# Patient Record
Sex: FEMALE | Race: WHITE | Employment: UNEMPLOYED | URBAN - METROPOLITAN AREA
[De-identification: names, ages, dates, MRNs, and addresses within clinical notes are randomized per-mention and may not be internally consistent; named-entity substitution may affect disease eponyms.]

---

## 2019-10-09 ENCOUNTER — TRANSFERRED RECORDS (OUTPATIENT)
Dept: HEALTH INFORMATION MANAGEMENT | Facility: CLINIC | Age: 54
End: 2019-10-09

## 2019-10-10 ENCOUNTER — TRANSFERRED RECORDS (OUTPATIENT)
Dept: HEALTH INFORMATION MANAGEMENT | Facility: CLINIC | Age: 54
End: 2019-10-10

## 2019-12-13 ENCOUNTER — TRANSFERRED RECORDS (OUTPATIENT)
Dept: HEALTH INFORMATION MANAGEMENT | Facility: CLINIC | Age: 54
End: 2019-12-13

## 2019-12-16 ENCOUNTER — TRANSCRIBE ORDERS (OUTPATIENT)
Dept: OTHER | Age: 54
End: 2019-12-16

## 2019-12-16 DIAGNOSIS — R53.1 WEAKNESS: Primary | ICD-10-CM

## 2019-12-17 ENCOUNTER — DOCUMENTATION ONLY (OUTPATIENT)
Dept: CARE COORDINATION | Facility: CLINIC | Age: 54
End: 2019-12-17

## 2019-12-17 NOTE — TELEPHONE ENCOUNTER
RECORDS RECEIVED FROM: External - Dr Ibarra - [facility]   DATE RECEIVED: 2/12/20   NOTES (FOR ALL VISITS) STATUS DETAILS   OFFICE NOTE from referring provider Care Everywhere 11/21/19  10/16/19  10/1/19   OFFICE NOTE from other specialist Care Everywhere [facility]:  9/24/19   DISCHARGE SUMMARY from hospital N/A    DISCHARGE REPORT from the ER N/A    OPERATIVE REPORT N/A    MEDICATION LIST Care Everywhere    IMAGING  (FOR ALL VISITS)     EMG Received [facility]:  10/9/19   EEG N/A    ECT N/A    MRI (HEAD, NECK, SPINE) Received [facility]:  MRI Head 10/9/19  MRI Cervical Spine 10/9/19  MRI Brain 2/22/18   LUMBAR PUNCTURE N/A    DANNY Scan N/A    CT (HEAD, NECK, SPINE) N/A       Action 12/17/19   Action Taken Imaging request faxed to [facility] for:  MRI Head 10/9/19  MRI Cervical Spine 10/9/19  MRI Brain 2/22/18     Action 12/17/19   Action Taken EMG report request faxed to [facility] ([facility])     Action 1/3/20 MV 2.26pm   Action Taken 2nd request faxed to [facility] for EMG report     Action 1/7/20 MV 11.58am   Action Taken EMG report received via fax from [facility]. Sent to scanning

## 2019-12-19 NOTE — TELEPHONE ENCOUNTER
Imaging Received  12/19/19  [facility]   Image Type (x): Disc:   PACS: x   Exam Date/Name MRI Head 10/9/19  MRI Cervical Spine 10/9/19  MRI Brain 2/22/18 Comments: images resolved in PACS

## 2020-01-02 ENCOUNTER — TELEPHONE (OUTPATIENT)
Dept: NEUROLOGY | Facility: CLINIC | Age: 55
End: 2020-01-02

## 2020-01-02 NOTE — TELEPHONE ENCOUNTER
Patient can come at 1/6/2020 at 8.50 am if she wishes. Please let her know. I will contact Dr Ibarra later. Thank you

## 2020-01-02 NOTE — TELEPHONE ENCOUNTER
Tuesday 1/14, at 1.00 pm. If she accepts, please call Dr Ibarra's office too and tell them we scheduled her sooner per their request. Thank you

## 2020-01-02 NOTE — TELEPHONE ENCOUNTER
[facility] Call Center    Phone Message    May a detailed message be left on voicemail: yes    Reason for Call: Other: Dr. Ibarra from [facility] requesting call back from Dr. Friedman to discuss this pt, and possibly getting this pt in sooner     Action Taken: Message routed to:  [facility] ([facility] neuro

## 2020-01-14 ENCOUNTER — OFFICE VISIT (OUTPATIENT)
Dept: NEUROLOGY | Facility: CLINIC | Age: 55
End: 2020-01-14
Attending: PSYCHIATRY & NEUROLOGY
Payer: COMMERCIAL

## 2020-01-14 ENCOUNTER — OFFICE VISIT (OUTPATIENT)
Dept: NEUROLOGY | Facility: CLINIC | Age: 55
End: 2020-01-14
Payer: COMMERCIAL

## 2020-01-14 VITALS
DIASTOLIC BLOOD PRESSURE: 90 MMHG | WEIGHT: 164.7 LBS | OXYGEN SATURATION: 97 % | SYSTOLIC BLOOD PRESSURE: 153 MMHG | HEIGHT: 61 IN | RESPIRATION RATE: 16 BRPM | HEART RATE: 79 BPM | BODY MASS INDEX: 31.1 KG/M2

## 2020-01-14 DIAGNOSIS — G12.21 ALS (AMYOTROPHIC LATERAL SCLEROSIS) (H): Primary | ICD-10-CM

## 2020-01-14 DIAGNOSIS — G56.02 CARPAL TUNNEL SYNDROME OF LEFT WRIST: ICD-10-CM

## 2020-01-14 RX ORDER — ESCITALOPRAM OXALATE 20 MG/1
20 TABLET ORAL DAILY
COMMUNITY
Start: 2016-12-28

## 2020-01-14 RX ORDER — CIPROFLOXACIN 500 MG/1
500 TABLET, FILM COATED ORAL PRN
COMMUNITY
Start: 2019-11-25

## 2020-01-14 RX ORDER — TRAZODONE HYDROCHLORIDE 50 MG/1
50 TABLET, FILM COATED ORAL DAILY
COMMUNITY
Start: 2017-10-29

## 2020-01-14 ASSESSMENT — MIFFLIN-ST. JEOR: SCORE: 1279.45

## 2020-01-14 ASSESSMENT — PAIN SCALES - GENERAL: PAINLEVEL: NO PAIN (0)

## 2020-01-14 NOTE — LETTER
"     RE: Klarissa Ni  [address] [address]     Dear Colleague,    Thank you for referring your patient, Klarissa Ni, to the [facility]. Please see a copy of my visit note below.    Service Date: 2020      Michael Ibarra MD   [facility]    [address]   [place] [address]      RE: Klarissa Ni   MRN: [id number]   : 1965      Dear Dr. Ibarra:      I had the pleasure to see Ms. Ni at the [facility] for an evaluation of the cause of her generalized weakness and dysarthria.  As you know, she is a very pleasant 54-year-old woman with a  history of 2 lumbar spine surgeries and depression.  Otherwise, no notable medical history.  Around 2019, after a regular walk outdoors she noticed a sudden onset of shortness of breath.  She was hospitalized locally and underwent extensive cardiac evaluation including echo and a coronary angiogram that were unremarkable.  [facility] where [facility] had imaging and she was told there is no significant intrathoracic pathology to explain her complaints.  Her dyspnea on exertion has persisted to date.  It is not accompanied by a cough or phlegm production.  She denies orthopnea.  She sleeps with 1 pillow at night. She does not wake up with morning headaches.  She does not have any lower extremity edema.  A cause of the dyspnea was never found.      Around 2019 she began developing dysarthria.  Her voice became more \" scratchy\" and hoarse. Those symptoms deteriorated over the subsequent 6 months to the point she has to repeat herself to others at times.  She denies sialorrhea or pseudobulbar affect.  She does have dysphagia in the last 2 or 3 months with solids or liquids and occasional choking episodes.  She has lost about 23-24 pounds of weight in the past year.  She denies ptosis, diplopia or facial numbness. "  She notes occasional head drop at any time during the day, needing to hold her head up with her fingers.  She also has noted generalized muscle weakness.  It began from her legs in the mid year with difficulty getting up from low seated chairs and ascending or descending stairs.  Now she occasionally trips on her feet and has a tendency to fall.  She does not use any assistive device.   In the past 6 months or so she has also noted significant upper extremity weakness, left much more than right.  She has trouble mostly with fine motor tasks, opening jars, etc.  Her shoulder muscles also fatigue when she blow dries her hair. She denies any numbness or tingling and there is no incontinence of bowel or bladder.  She does have calf pain and cramps at times and she reports frequent muscle twitching in upper and lower extremities.      She had an evaluation locally including an MRI of the brain done on 10/10/2019 in [place] which showed a single white matter lesion in T2 FLAIR within the left frontal operculum, which could be related to migraines but no other acute intracranial abnormality or real explanation for her symptoms.  MRI of the cervical spine done on the same day showed mild to moderate degree of spinal stenosis, again not explaining her hand weakness.  She had an EMG done by Dr. Nice on 10/09/2019.  I reviewed the results.  This EMG was interpreted as severe bilateral median neuropathies at the wrists consistent with carpal tunnel syndrome.  There was no evidence of ulnar neuropathy at the elbow.  Needle examination showed some denervation changes in the APB, FDI and right flexor carpi ulnaris, but deltoid, biceps and triceps and pronator teres were labeled normal.  I reviewed the results and I do not agree with the diagnosis of severe carpal tunnel.  There is mild median neuropathy at the wrist as there is slowing of sensory nerve conduction study, but there is marked attenuation of the CMAP amplitude of  the APB with normal or minimally prolonged median motor latencies, which is not consistent with carpal tunnel.      PAST MEDICAL HISTORY, FAMILY HISTORY, REVIEW OF SYSTEMS, CURRENT MEDICATIONS:  As outlined in Epic record.      ALLERGIES:  Penicillin (rash).      FAMILY HISTORY:  She has no family history of dementia, ALS or other neurodegenerative disease as Parkinson's.        She does not smoke, use alcohol or illicit drugs.      PHYSICAL EXAMINATION:   VITAL SIGNS:  Blood pressure of 153/90.  Pulse 79 and regular.  Respiratory rate 16.  O2 sat 97% on room air.  Weight 74.7 kilos.  Height is 154.  She endorsed no pain.   NEUROLOGIC:  She is awake, alert and oriented x3.  She is able to provide a coherent history.  She has no ptosis or ophthalmoparesis.  Ductions and versions of the eyes are normal.  Visual fields are full to confrontation bilaterally.  Pupil reaction to light and accommodation is normal.  There is mild weakness of orbicularis oculi bilaterally.  There is moderate to severe weakness of orbicularis oris.  Cheek puff in particular is very weak.  Tongue shows minimal atrophy and some subtle fasciculations, which are best visualized with the tongue immobile in the mouth and with a penlight.  Lateral tongue movements are mildly slow.  There is a brisk jaw jerk.  She has a clearcut spastic dysarthria.  She is fully intelligible still.  Uvula and palate seem to be elevating normally.  She has a little weak cough reflex.  She has at least moderate weakness of the pterygoids.  She does have some fasciculations in the mentalis as well.  Neck flexion was 4/5 and so was extension.  Strength in the limbs was as follows on MRC Scale (right/left):  Deltoids 4/4, pectoralis 4-/4-, biceps 4/4+, triceps 5/5-, wrist extension showed some motor impersistence and were difficult to rate, finger extension 4/3 to 4-, FDI 4-/3, APB 2/0.  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  There were fasciculations in the FDI and forearm muscles bilaterally.  I did not see any fasciculations in the legs.  Tone was normal in upper and lower extremities.  Strength was 4- to 4/5 for bilateral hip flexion.  Knee extension was somewhat impersistent and difficult to rate, right knee flexion was probably 4 on the left and 4+ on the right and foot dorsiflexion was right 4, left nearly 5.  Reflexes were 3 in biceps, triceps, brachioradialis with vertical spread in all.  She has positive pectoralis and Alhaji.  Knee jerks were right normal 2+, left 3+ with mild spread and ankle jerks were 2+ and symmetric.  There is no ankle clonus and toes were downgoing.  Sensory exam was intact to light touch, pinprick, and position sensation in toes and hands.  She had mild tremor of outstretched hands.  She had no dysmetria on finger-to-nose.  She was able to get up from a chair with arms crossed on the chest, but with some difficulty.  Romberg sign was negative.  She could walk on her toes, but she was challenged to walk on her heels.      EMG was repeated today. There was widespread active and to a lesser extent chronic denervation involving bulbar muscles (trapezius), multiple cervical, thoracic, and lumbosacral muscles.    IMPRESSION:  Amyotrophic lateral sclerosis, clinically probable to definite.        I spent about 30 minutes with the patient, of which more than half was counseling. I believe that patient's history and physical examination were telling.  She has upper and lower motor neuron signs in the bulbar region with a classic spastic dysarthria and a brisk jaw jerk, subtle tongue fasciculations and obvious facial, jaw and tongue weakness.  She also has hyperreflexia in the upper extremities with striking segmental weakness and a classic pattern of hand muscle atrophy called split hand. There is practically no other diagnosis that fits with this pattern, especially knowing that her MRI of brain  and cervical spine did not show an alternative explanation and that her EMG confirmed the presence of widespread denervation including cranial muscles.     The patient unfortunately had no warning that this could be motor neuron disease, although she did not appear 100% surprised. I offered emotional support. From here on I recommend that she follows in our multidisciplinary ALS Clinic because there are a number of things we need to discuss including evaluation by physical, occupational, speech and nutrition therapists, medications that can slightly slow the disease progression, research options available, registration with ALS Association and other patient support groups, etc.  The patient is willing to follow and we will arrange for an appointment as soon as possible.  Of course, she also needs spirometry given her complaints of dyspnea and we will order this.  Spirometry results are critical for her prognosis.     We did not discuss details about the natural history, prognosis and management of ALS, except that the patient asked me about the average survival and I told her this is usually about 3 years from the moment symptoms start, with 20% of patients living more than 5, and 10% living more than 10.       Sincerely,       Latrell Friedman MD       cc:   Amador Alex MD   [facility]    [address] [place] [id number]       D: 2020   T: 2020   MT: AUDELIA      Name:     DINA SMITH   MRN:      [id number]        Account:      [id number]   :      1965           Service Date: 2020      Document: [id number]

## 2020-01-14 NOTE — NURSING NOTE
Chief Complaint   Patient presents with     New Patient     ump new muscular dystrophy     Luzma Simeon cma

## 2020-01-14 NOTE — PROGRESS NOTES
"Service Date: 2020      Michael Ibarra MD   [facility]    [address]   [place] [address]      RE: Klarissa Ni   MRN: [id number]   : 1965      Dear Dr. Ibarra:      I had the pleasure to see Ms. Ni at the [facility] for an evaluation of the cause of her generalized weakness and dysarthria.  As you know, she is a very pleasant 54-year-old woman with a  history of 2 lumbar spine surgeries and depression.  Otherwise, no notable medical history.  Around 2019, after a regular walk outdoors she noticed a sudden onset of shortness of breath.  She was hospitalized locally and underwent extensive cardiac evaluation including echo and a coronary angiogram that were unremarkable.  She then went to a [facility] where she had imaging and she was told there is no significant intrathoracic pathology to explain her complaints.  Her dyspnea on exertion has persisted to date.  It is not accompanied by a cough or phlegm production.  She denies orthopnea.  She sleeps with 1 pillow at night. She does not wake up with morning headaches.  She does not have any lower extremity edema.  A cause of the dyspnea was never found.      Around 2019 she began developing dysarthria.  Her voice became more \" scratchy\" and hoarse. Those symptoms deteriorated over the subsequent 6 months to the point she has to repeat herself to others at times.  She denies sialorrhea or pseudobulbar affect.  She does have dysphagia in the last 2 or 3 months with solids or liquids and occasional choking episodes.  She has lost about 23-24 pounds of weight in the past year.  She denies ptosis, diplopia or facial numbness.  She notes occasional head drop at any time during the day, needing to hold her head up with her fingers.  She also has noted generalized muscle weakness.  It began from her legs in the mid year with difficulty getting up from low seated chairs and ascending or " descending stairs.  Now she occasionally trips on her feet and has a tendency to fall.  She does not use any assistive device.   In the past 6 months or so she has also noted significant upper extremity weakness, left much more than right.  She has trouble mostly with fine motor tasks, opening jars, etc.  Her shoulder muscles also fatigue when she blow dries her hair. She denies any numbness or tingling and there is no incontinence of bowel or bladder.  She does have calf pain and cramps at times and she reports frequent muscle twitching in upper and lower extremities.      She had an evaluation locally including an MRI of the brain done on 10/10/2019 in [place] which showed a single white matter lesion in T2 FLAIR within the left frontal operculum, which could be related to migraines but no other acute intracranial abnormality or real explanation for her symptoms.  MRI of the cervical spine done on the same day showed mild to moderate degree of spinal stenosis, again not explaining her hand weakness.  She had an EMG done by Dr. Nice on 10/09/2019.  I reviewed the results.  This EMG was interpreted as severe bilateral median neuropathies at the wrists consistent with carpal tunnel syndrome.  There was no evidence of ulnar neuropathy at the elbow.  Needle examination showed some denervation changes in the APB, FDI and right flexor carpi ulnaris, but deltoid, biceps and triceps and pronator teres were labeled normal.  I reviewed the results and I do not agree with the diagnosis of severe carpal tunnel.  There is mild median neuropathy at the wrist as there is slowing of sensory nerve conduction study, but there is marked attenuation of the CMAP amplitude of the APB with normal or minimally prolonged median motor latencies, which is not consistent with carpal tunnel.      PAST MEDICAL HISTORY, FAMILY HISTORY, REVIEW OF SYSTEMS, CURRENT MEDICATIONS:  As outlined in Epic record.      ALLERGIES:  Penicillin (rash).       FAMILY HISTORY:  She has no family history of dementia, ALS or other neurodegenerative disease as Parkinson's.        She does not smoke, use alcohol or illicit drugs.      PHYSICAL EXAMINATION:   VITAL SIGNS:  Blood pressure of 153/90.  Pulse 79 and regular.  Respiratory rate 16.  O2 sat 97% on room air.  Weight 74.7 kilos.  Height is 154.  She endorsed no pain.   NEUROLOGIC:  She is awake, alert and oriented x3.  She is able to provide a coherent history.  She has no ptosis or ophthalmoparesis.  Ductions and versions of the eyes are normal.  Visual fields are full to confrontation bilaterally.  Pupil reaction to light and accommodation is normal.  There is mild weakness of orbicularis oculi bilaterally.  There is moderate to severe weakness of orbicularis oris.  Cheek puff in particular is very weak.  Tongue shows minimal atrophy and some subtle fasciculations, which are best visualized with the tongue immobile in the mouth and with a penlight.  Lateral tongue movements are mildly slow.  There is a brisk jaw jerk.  She has a clearcut spastic dysarthria.  She is fully intelligible still.  Uvula and palate seem to be elevating normally.  She has a little weak cough reflex.  She has at least moderate weakness of the pterygoids.  She does have some fasciculations in the mentalis as well.  Neck flexion was 4/5 and so was extension.  Strength in the limbs was as follows on MRC Scale (right/left):  Deltoids 4/4, pectoralis 4-/4-, biceps 4/4+, triceps 5/5-, wrist extension showed some motor impersistence and were difficult to rate, finger extension 4/3 to 4-, FDI 4-/3, APB 2/0.  There is marked thenar>FDI atrophy bilaterally, with a classic split hand syndrome on both sides.  There were fasciculations in the FDI and forearm muscles bilaterally.  I did not see any fasciculations in the legs.  Tone was normal in upper and lower extremities.  Strength was 4- to 4/5 for bilateral hip flexion.  Knee extension was somewhat  impersistent and difficult to rate, right knee flexion was probably 4 on the left and 4+ on the right and foot dorsiflexion was right 4, left nearly 5.  Reflexes were 3 in biceps, triceps, brachioradialis with vertical spread in all.  She has positive pectoralis and Alhaji.  Knee jerks were right normal 2+, left 3+ with mild spread and ankle jerks were 2+ and symmetric.  There is no ankle clonus and toes were downgoing.  Sensory exam was intact to light touch, pinprick, and position sensation in toes and hands.  She had mild tremor of outstretched hands.  She had no dysmetria on finger-to-nose.  She was able to get up from a chair with arms crossed on the chest, but with some difficulty.  Romberg sign was negative.  She could walk on her toes, but she was challenged to walk on her heels.      EMG was repeated today. There was widespread active and to a lesser extent chronic denervation involving bulbar muscles (trapezius), multiple cervical, thoracic, and lumbosacral muscles.    IMPRESSION:  Amyotrophic lateral sclerosis, clinically probable to definite.        I spent about 30 minutes with the patient, of which more than half was counseling. I believe that patient's history and physical examination were telling.  She has upper and lower motor neuron signs in the bulbar region with a classic spastic dysarthria and a brisk jaw jerk, subtle tongue fasciculations and obvious facial, jaw and tongue weakness.  She also has hyperreflexia in the upper extremities with striking segmental weakness and a classic pattern of hand muscle atrophy called split hand. There is practically no other diagnosis that fits with this pattern, especially knowing that her MRI of brain and cervical spine did not show an alternative explanation and that her EMG confirmed the presence of widespread denervation including cranial muscles.     The patient unfortunately had no warning that this could be motor neuron disease, although she did not  appear 100% surprised. I offered emotional support. From here on I recommend that she follows in our multidisciplinary ALS Clinic because there are a number of things we need to discuss including evaluation by physical, occupational, speech and nutrition therapists, medications that can slightly slow the disease progression, research options available, registration with ALS Association and other patient support groups, etc.  The patient is willing to follow and we will arrange for an appointment as soon as possible.  Of course, she also needs spirometry given her complaints of dyspnea and we will order this.  Spirometry results are critical for her prognosis.     We did not discuss details about the natural history, prognosis and management of ALS, except that the patient asked me about the average survival and I told her this is usually about 3 years from the moment symptoms start, with 20% of patients living more than 5, and 10% living more than 10.       Sincerely,       Latrell Dorantes MD       cc:   Amador Alex MD   [place] [id number] [place] [id number]         LATRELL DORANTES MD             D: 2020   T: 2020   MT: AUDELIA      Name:     DINA SMITH   MRN:      [id number]        Account:      [id number]   :      1965           Service Date: 2020      Document: [id number]

## 2020-01-14 NOTE — PROGRESS NOTES
[facility]  Electrodiagnostic Laboratory    Nerve Conduction & EMG Report          Patient:       Klarissa Ni  Patient ID:    [id number]  Gender:        Female  YOB: 1965  Age:           54 Years 6 Months      History & Examination:    54 year old woman with suspected bulbar onset ALS. EMG requested to evaluate for motor neuron disease.    Techniques: Motor and sensory conduction studies were done with surface recording electrodes. Temperature was monitored and recorded throughout the study. Upper extremities were maintained at a temperature of 32 degrees Centigrade or higher.  Lower extremities were maintained at a temperature of 31degrees Centigrade or higher. EMG was done with a concentric needle electrode.        Results:    Left median antidromic sensory NCS showed attenuated conduction velocity and normal SNAP amplitude. Left ulnar and sural antidromic sensory NCSs were normal. Left median motor NCS recorded from APB showed no response. Left ulnar motor NCS showed prolonged distal latency, attenuated CMAP amplitudes without any segmental changes, mildly attenuated conduction velocities at the forearm and elbow segments, and normal conduction velocity at the upper arm. Left deep peroneal motor NCS recorded from EDB showed normal distal latency, mildly attenuated CMAP amplitudes, and normal conduction velocities. Left tibial motor NCS and F-waves were normal. Needle EMG showed widespread denervation changes with prominent fibrillations, fasciculations, and variably reduced recruitment of MUPs with variable appearance, but generally polyphasic, and occasionally large amplitude and long-duration. This pattern was observed in every single muscle sampled, including the left lumbosacral, thoracic, cervical, and bulbar (upper trapezius) segments/myotomes. Please see table for details.     Interpretation:    Abnormal study. There is electrodiagnostic evidence of widespread changes of  denervation and reinnervation among multiple regions, fulfilling the [place] electrodiagnostic criteria for clinically probable, laboratory supported ALS. In addition, there is electrodiagnostic evidence of a mild, coexistent, left median neuropathy at the wrist, as seen in carpal tunnel syndrome.     EMG Physician:    Latrell Friedman MD       Sensory NCS      Nerve / Sites Rec. Site Onset Peak NP Amp Ref. PP Amp Dist Wilberto Ref. Temp     ms ms  V  V  V cm m/s m/s  C   L MEDIAN - Dig II Anti      Wrist Dig II 3.70 4.90 19.7 10.0 34.4 14 37.9 48.0 32.4   L ULNAR - Dig V Anti      Wrist Dig V 2.60 3.70 29.1 8.0 49.5 12.5 48.0 48.0 32.4   L SURAL - Lat Mall      Calf Ankle 2.76 3.65 10.9 8.0 12.3 14 50.7 38.0 31       Motor NCS      Nerve / Sites Rec. Site Lat Ref. Amp Ref. Rel Amp Dist Wilberto Ref. Dur. Area Temp.     ms ms mV mV % cm m/s m/s ms %  C   L MEDIAN - APB      Wrist APB NR 4.40 NR 5.0 NR 8   NR NR 32.4   L ULNAR - ADM      Wrist ADM 4.74 3.50 2.6 5.0 100 8   7.03 100 24.8      B.Elbow ADM 8.49  2.6  100 17.5 46.7 48.0 7.55 98.7 25      A.Elbow ADM 10.78  2.5  97.3 10 43.6 48.0 7.55 100 24.8      Axilla ADM 12.29  2.5  94 8.5 56.3 48.0 7.45 94.1 24.7   L DEEP PERONEAL - EDB      Ankle EDB 4.79 6.00 2.3 2.5 100 8   7.03 100 24.7      FibHead EDB 10.89  1.7  75.8 29 47.6 38.0 7.76 83.4 24.7      Pop Fos EDB 12.76  1.7  76.7 11 58.7 38.0 7.97 87 24.7   L TIBIAL - AH      Ankle AH 4.06 6.00 4.5 4.0 100 8   5.68 100 24.6      Pop Fos AH 11.82  3.6  79.9 35 45.1 38.0 7.60 103 24.6       F  Wave      Nerve Min F Lat Max F Lat Mean FLat Temp.    ms ms ms  C   L TIBIAL 43.44 55.16 46.72 24.6       EMG Summary Table     Spontaneous MUAP Recruitment    IA Fib/PSW Fasc H.F. Amp Dur. PPP Pattern   L. TIB ANTERIOR N 3+ 1+ None N N N Severely Reduced   L. GASTROCN (MED) N 3+ 1+ None N 1+ 1+ Severely Reduced   L. VAST LATERALIS N 3+ 2+ None 1+ 1+ 2+ Severely Reduced   L. FIRST D INTEROSS N 3+ 1+ None N N 2+ Severely  Reduced   L. TRICEPS N None None None N N N N   L. DELTOID N 2+ 1+ None 1+ 1+ 1+ Moderately Reduced   L. PRON TERES N 3+ 2+ None N 1+ 1+ Moderately Reduced   L. BICEPS N 3+ 2+ None 1+ 1+ N Moderately Reduced   L. TRAPEZIUS (U) N 2+ 2+ None N 1+ 1+ Moderately Reduced   L. THOR PSP (M) N 3+ None None N N N N

## 2020-01-14 NOTE — LETTER
1/14/2020       RE: Klarissa Ni  [address] [id number]     Dear Colleague,    Thank you for referring your patient, Klarissa Ni, to the [facility] EMG at [facility]. Please see a copy of my visit note below.        [facility]  Electrodiagnostic Laboratory    Nerve Conduction & EMG Report        Patient:       Klarissa Ni  Patient ID:    [id number]  Gender:        Female  YOB: 1965  Age:           54 Years 6 Months      History & Examination:    54 year old woman with suspected bulbar onset ALS. EMG requested to evaluate for motor neuron disease.    Techniques: Motor and sensory conduction studies were done with surface recording electrodes. Temperature was monitored and recorded throughout the study. Upper extremities were maintained at a temperature of 32 degrees Centigrade or higher.  Lower extremities were maintained at a temperature of 31degrees Centigrade or higher. EMG was done with a concentric needle electrode.        Results:    Left median antidromic sensory NCS showed attenuated conduction velocity and normal SNAP amplitude. Left ulnar and sural antidromic sensory NCSs were normal. Left median motor NCS recorded from APB showed no response. Left ulnar motor NCS showed prolonged distal latency, attenuated CMAP amplitudes without any segmental changes, mildly attenuated conduction velocities at the forearm and elbow segments, and normal conduction velocity at the upper arm. Left deep peroneal motor NCS recorded from EDB showed normal distal latency, mildly attenuated CMAP amplitudes, and normal conduction velocities. Left tibial motor NCS and F-waves were normal. Needle EMG showed widespread denervation changes with prominent fibrillations, fasciculations, and variably reduced recruitment of MUPs with variable appearance, but generally polyphasic, and occasionally large amplitude and long-duration. This pattern was  observed in every single muscle sampled, including the left lumbosacral, thoracic, cervical, and bulbar (upper trapezius) segments/myotomes. Please see table for details.     Interpretation:    Abnormal study. There is electrodiagnostic evidence of widespread changes of denervation and reinnervation among multiple regions, fulfilling the [place] electrodiagnostic criteria for clinically probable, laboratory supported ALS. In addition, there is electrodiagnostic evidence of a mild, coexistent, left median neuropathy at the wrist, as seen in carpal tunnel syndrome.     EMG Physician:    Latrell Friedman MD       Sensory NCS      Nerve / Sites Rec. Site Onset Peak NP Amp Ref. PP Amp Dist Wilberto Ref. Temp     ms ms  V  V  V cm m/s m/s  C   L MEDIAN - Dig II Anti      Wrist Dig II 3.70 4.90 19.7 10.0 34.4 14 37.9 48.0 32.4   L ULNAR - Dig V Anti      Wrist Dig V 2.60 3.70 29.1 8.0 49.5 12.5 48.0 48.0 32.4   L SURAL - Lat Mall      Calf Ankle 2.76 3.65 10.9 8.0 12.3 14 50.7 38.0 31       Motor NCS      Nerve / Sites Rec. Site Lat Ref. Amp Ref. Rel Amp Dist Wilberto Ref. Dur. Area Temp.     ms ms mV mV % cm m/s m/s ms %  C   L MEDIAN - APB      Wrist APB NR 4.40 NR 5.0 NR 8   NR NR 32.4   L ULNAR - ADM      Wrist ADM 4.74 3.50 2.6 5.0 100 8   7.03 100 24.8      B.Elbow ADM 8.49  2.6  100 17.5 46.7 48.0 7.55 98.7 25      A.Elbow ADM 10.78  2.5  97.3 10 43.6 48.0 7.55 100 24.8      Axilla ADM 12.29  2.5  94 8.5 56.3 48.0 7.45 94.1 24.7   L DEEP PERONEAL - EDB      Ankle EDB 4.79 6.00 2.3 2.5 100 8   7.03 100 24.7      FibHead EDB 10.89  1.7  75.8 29 47.6 38.0 7.76 83.4 24.7      Pop Fos EDB 12.76  1.7  76.7 11 58.7 38.0 7.97 87 24.7   L TIBIAL - AH      Ankle AH 4.06 6.00 4.5 4.0 100 8   5.68 100 24.6      Pop Fos AH 11.82  3.6  79.9 35 45.1 38.0 7.60 103 24.6       F  Wave      Nerve Min F Lat Max F Lat Mean FLat Temp.    ms ms ms  C   L TIBIAL 43.44 55.16 46.72 24.6       EMG Summary Table     Spontaneous MUAP Recruitment     IA Fib/PSW Fasc H.F. Amp Dur. PPP Pattern   L. TIB ANTERIOR N 3+ 1+ None N N N Severely Reduced   L. GASTROCN (MED) N 3+ 1+ None N 1+ 1+ Severely Reduced   L. VAST LATERALIS N 3+ 2+ None 1+ 1+ 2+ Severely Reduced   L. FIRST D INTEROSS N 3+ 1+ None N N 2+ Severely Reduced   L. TRICEPS N None None None N N N N   L. DELTOID N 2+ 1+ None 1+ 1+ 1+ Moderately Reduced   L. PRON TERES N 3+ 2+ None N 1+ 1+ Moderately Reduced   L. BICEPS N 3+ 2+ None 1+ 1+ N Moderately Reduced   L. TRAPEZIUS (U) N 2+ 2+ None N 1+ 1+ Moderately Reduced   L. THOR PSP (M) N 3+ None None N N N N                        Again, thank you for allowing me to participate in the care of your patient.      Sincerely,    Latrell Friedman MD

## 2020-01-15 DIAGNOSIS — G12.21 ALS (AMYOTROPHIC LATERAL SCLEROSIS) (H): Primary | ICD-10-CM

## 2020-01-16 ENCOUNTER — THERAPY VISIT (OUTPATIENT)
Dept: PHYSICAL THERAPY | Facility: CLINIC | Age: 55
End: 2020-01-16
Payer: COMMERCIAL

## 2020-01-16 ENCOUNTER — THERAPY VISIT (OUTPATIENT)
Dept: OCCUPATIONAL THERAPY | Facility: CLINIC | Age: 55
End: 2020-01-16
Payer: COMMERCIAL

## 2020-01-16 ENCOUNTER — THERAPY VISIT (OUTPATIENT)
Dept: SPEECH THERAPY | Facility: CLINIC | Age: 55
End: 2020-01-16
Payer: COMMERCIAL

## 2020-01-16 ENCOUNTER — OFFICE VISIT (OUTPATIENT)
Dept: NEUROLOGY | Facility: CLINIC | Age: 55
End: 2020-01-16
Payer: COMMERCIAL

## 2020-01-16 ENCOUNTER — PATIENT OUTREACH (OUTPATIENT)
Dept: CARE COORDINATION | Facility: CLINIC | Age: 55
End: 2020-01-16

## 2020-01-16 VITALS
OXYGEN SATURATION: 96 % | HEART RATE: 99 BPM | WEIGHT: 161 LBS | BODY MASS INDEX: 30.42 KG/M2 | DIASTOLIC BLOOD PRESSURE: 91 MMHG | SYSTOLIC BLOOD PRESSURE: 135 MMHG

## 2020-01-16 DIAGNOSIS — G12.21 ALS (AMYOTROPHIC LATERAL SCLEROSIS) (H): Primary | ICD-10-CM

## 2020-01-16 DIAGNOSIS — R13.12 OROPHARYNGEAL DYSPHAGIA: ICD-10-CM

## 2020-01-16 DIAGNOSIS — G12.21 ALS (AMYOTROPHIC LATERAL SCLEROSIS) (H): ICD-10-CM

## 2020-01-16 DIAGNOSIS — Z74.09 IMPAIRED MOBILITY AND ADLS: ICD-10-CM

## 2020-01-16 DIAGNOSIS — Z78.9 IMPAIRED MOBILITY AND ADLS: Primary | ICD-10-CM

## 2020-01-16 DIAGNOSIS — Z78.9 IMPAIRED MOBILITY AND ADLS: ICD-10-CM

## 2020-01-16 DIAGNOSIS — R47.1 DYSARTHRIA: Primary | ICD-10-CM

## 2020-01-16 DIAGNOSIS — Z78.9 IMPAIRED INSTRUMENTAL ACTIVITIES OF DAILY LIVING (IADL): ICD-10-CM

## 2020-01-16 DIAGNOSIS — Z74.09 IMPAIRED MOBILITY AND ADLS: Primary | ICD-10-CM

## 2020-01-16 ASSESSMENT — REVISED AMYOTROPHIC LATERAL SCLEROSIS FUNCTIONAL RATING SCALE (ALSFRS-R)
SALIVATION: 4
CUTTING_FOOD_AND_HANDLING_UTENSILES: 2
DRESSING_AND_HYGEINE: 3
CLIMBING_STAIRS: 1
BULBAR_SUBTOTAL: 8
FINE_MOTOR_SUBTOTAL_SCORE: 8
ORTHOPENA: SOME DIFFICULTY SLEEPING AT NIGHT DUE TO SHORTNESS OF BREATH, DOES NOT ROUTINELY USE MORE THAN TWO PILLOWS
SIX_ITEM_SUBTOTAL: 14
DYSPNEA: 1
DYSPNEA: OCCURS AT REST: DIFFICULTY BREATHING WHEN EITHER SITTING OR LYING
WALKING: 3
WALKING: EARLY AMBULATION DIFFICULTIES
SWALLOWING: EARLY EATING PROBLEMS - OCCASIONAL CHOKING
ALSFRS_TOTAL_SCORE: 31
SPEECH: SPEECH COMBINED WITH NON-VOCAL COMMUNICATION
GROSS_MOTOR_SUBTOTAL_SCORE: 7
TURNING_IN_BED_AND_ADJUSTING_BED_CLOTHES: 3
HANDWRITING: SLOW OR SLOPPY: ALL WORDS ARE LEGIBLE
SWALLOWING: 3
TURNING_IN_BED_AND_ADJUSTING_BED_CLOTHES: SOMEWHAT SLOW AND CLUMSY, BUT NO HELP NEEDED
CLIMBING_STAIRS: NEEDS ASSISTANCE
SALIVATION: NORMAL
HANDWRITING: 3
DRESSING_AND_HYGEINE: INDEPENDENT AND COMPLETE SELF-CARE WITH EFFORT OR DECREASED EFFICIENCY
RESPIRATORY_INSUFFICIENCY: 4
ORTHOPENA: 3
RESPIRATORY_SUBTOTAL_SCORE: 8
SPEECH: 1

## 2020-01-16 ASSESSMENT — PAIN SCALES - GENERAL: PAINLEVEL: MILD PAIN (3)

## 2020-01-16 NOTE — NURSING NOTE
Chief Complaint   Patient presents with     Consult     [facility] NEW ALS/MOTOR NEURON     Anabel Lorenzo, EMT

## 2020-01-16 NOTE — PROGRESS NOTES
"    Outpatient Speech Language Pathology Neurology Clinic Evaluation  Klarissa Ni YOB: 1965 [id number]    Visit Date: 1/16/2020    Age: 54 year old    Medical Diagnosis: Amyotrophic lateral sclerosis (ALS)    Date of Diagnosis: 1/14/20    Referring MD: Dr Friedman     present: No    PMH: Refer to Medical Chart    Fall Risk:Refer to physician report.    Others at Clinic Visit: Spouse (Dat)    Living Situation: With others    Patient Concerns/Goals: Increased swallowing difficulty, Increased speech deficits    Observations: Patient just given diagnosis of ALS at [facility] two days ago therefore is feeling overwhelmed but open to education.    Current Mode of Nutrition: Oral diet    Weight: 161 lbs    Cardio-Respiratory Status: Forced vital capacity: 53%    Functional Rating Scale (ALS-FRS): 31/48    Eating Assessment Tool (EAT10): 11/40   2  My swallowing problem has caused me to lose weight   0  My swallowing problem iaaanterferes with my ability to go out for meals   1  Swallowing liquids takes extra effort   2  Swallowing solids takes extra effort   2  Swallowing pills takes extra effort   0  Swallowing is painful   0  The pleasure of eating is affected by my swallowing   2  When I swallow food sticks in my throat   1  I cough when I eat   1  Swallowing is stressful      Oral Motor/Swallowing  Oral Motor Function: Anomalies present:  Labial anomaly- adequate strength but slow movement  Lingual anomaly- mild weakness, slow movement  Diadochokinetic- labial intact, lingual slow with reduced articulation    Volitional Abilities:  Cough- Weak  Throat Clear- Functional  Swallow- Present    Swallow Function:   Thin Liquid-  Mildly reduced bolus control and reduced propulsion, multiple (two) swallows to clear bolus, mildly reduced laryngeal elevation. No overt s/s aspiration observed. She reports it does occur \"a few times per week\" but typically when taking \"fizzy\" drinks  Soft Solids- " Adequate oral phase. Mildly reduced laryngeal elevation. No overt s/s aspiration observed.  Regular- Mildly reduced bolus prep with reduced mastication, oral residue, reduced control, mildly reduced laryngeal elevation and overt s/s aspiration (immediate coughing)    Dysphagia Diagnosis: Mild dysphagia      Speech Intelligibility/Functional Communication   Methods of communication: Verbal    Dysarthria: Mild-moderate    Speech:   Deficits in phonation- Breathy quality, Strained-strangled quality (spastic) and Loudness (soft)  Deficits in articulation- Decreased precision of articulation and Slow effortful rate  Intelligibility- 100% in quiet environment    Speech Intelligibility/Communication:  Impacts social activities and gaining attention    Augmentative and Alternative Communication (AAC):   Instructed on systems as below, she does not wish to pursue at this time      Clinical Impression/Plan of Care  Treatment Diagnosis: Oropharyngeal Dysphagia and Dysarthia     Recommendations:  Oral diet of soft, moist solid with no particulate.  Continue use of compensatory swallow strategies.  Continue compensatory speech strategies.    Plan of Care:  1 session evaluation and treatment.    Goals: Based on today's evaluation session patient and/or caregiver will have understanding of current communication and/or swallowing status and recommendations for management.    Educational Assessment:  Learners- Patient and Significant other  Barriers to Learning- No barriers.    Education provided/response:   Speech  Swallowing  AAC  Diet  Verbalized understanding    Treatment provided this date:   Swallow intervention, 10 minutes  SLP educated regarding swallowing anatomy and physiology including aspiration and possible respiratory compromise from aspiration. Trained safe swallow strategies to reduce these risks including slow rate of intake, small bites/sips, chin tuck/neutral head position, mindful swallowing, use caution with  straws. Also trained diet modifications to reduce risk of aspiration and ease intake including avoid hard dry foods with particles and encouraged soft moist solids. SLP instructed on rationale for possible use of thickened liquids in the future but does not appear neccessary at this time.  Communication intervention, 11 minutes  SLP provided education regarding lingual and labial impact on speech and articulation. SLP trained use of speech strategies to improve intelligibility and reduce fatigue with speaking including reducing background noise, facing the conversation partner, speaking slowly, exaggerating each sound, repeating words or rewording message, using as few words as possible, planning ahead to reduce impact of fatigue, etc. SLP also trained conversational partners present today regarding listener strategies including giving speaker full attention, letting the speaker know if message not understood, repeating the part of the message that was understood so speaker only needs to repeat the part that was missed, asking yes/no questions when able. SLP introduced concept of Augmentative-Alternative Communication. Offered voice amplification system but she did not wish to pursue at this time. SLP also educated in detail re Voice Banking including reasoning and benefit and provided information re starting the process.    Response to recommendations/treatment: verbalized understanding, asked appropriate questions, agreed to recommendations.    Goal attainment: All goals met    Risks and benefits of evaluation/treatment have been explained.  Patient, family and/or caregiver are in agreement with Plan of Care.    Timed Code Treatment Minutes: 0 minutes timed codes    Total Treatment Time (sum of timed and untimed services): 34 minutes

## 2020-01-16 NOTE — PROGRESS NOTES
"    OUTPATIENT OCCUPATIONAL THERAPY CLINIC NOTE  Klarissa Ni  YOB: 1965  [id number]    Type of visit:  Evaluation            Date of service: 1/16/2020    Referring provider: Dr. 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    Cardio-respiratory status:  BiPAP will be started  Forced vital capacity: 53 % of predicted     Height/Weight: 5' 1\" / 161#    Living environment:  House, 1 story    Living environment barriers:  3 stairs to enter (1 railing present)  Full flight stairs within home (1 railing present)    Walk-in shower downstairs and tub only main   Regular height toilets    Current assistance/living environment:  Lives with spouse    1 son lives next door    Current mobility equipment:  None  4ww requested 1/16/20  Gait belt request 1/16/20    Current ADL equipment:  ? Shower/tub chair in storage     Technology used: computer some 1 x/week    Patient concerns/goals: foot drop, hand weakness-moderate, speech changes, doing steps to basement, weak and exhausted all the time, needed help to getup steps this morning, cannot open jug of milk and plu in items to outlet    Evaluation   Interview completed.   Pain assessment:  Pain present  Location: legs/Rating: muscular pain, cramps, 3/10, if kneels down    Range of motion: UE: R handed UE AROM reduced-end range " shoulders, L > R wrist ext, cannot oppose to all digits on L hand    Manual muscle testing: UE:  is fairly strong and lateral and palmar pinch are weak on the Left > right    Cognition:  WFL    ADL:   Feeding self:  Ind but harder to hold utensils and L hand cramps with cutting foods  Grooming: holding hair dryer and curling iron gets very fatigued and arms shake  UE Dressing:  Ind, avoids upper body clothing with buttons  LE Dressing:  Ind but buttons on jeans and zippers are difficult  Showering/bathing: Ind but fatigues with doing stairs to the basement and up again  Toileting/transfer:  Ind  Functional Mobility: L LE is weaker than right, foot drop    IADL:   Home management:  Does do meal prep and notes fatigue, hard to open containers/jars, pull tab cans,  Driving:  Does drive, not noting problems with R LE, taps quickly today, hard to turn key in ignition R hand  Occupation: stopped working due to back surgeries, elementary school cafeteria and playground.  Emergency Call system:carries cell phone with her      Fall Risk Screen:   Has the patient fallen 2 or more times in the last year? Yes , on step fell on knee and carrying things in hand      Has the patient fallen and had an injury in the past year? No       Timed Up and Go Score: defer to PT    Is the patient a fall risk? Yes, department fall risk interventions implemented     Impairments:  Fatigue  Muscle atrophy  Coordination  Balance  Range of motion  Respiratory compromise     Treatment diagnosis:  Impaired mobility  Impaired activities of daily living  IMpaired IADL    Assessment of Occupational Performance: 3-5 Performance Deficits  Identified Performance Deficits (ie: feeding, social skills): dressing, showering, eating, meal prep, grooming,driving, home management  Clinical Decision Making (Complexity): Moderate complexity     Recommendations/Plan of care:  Patient would benefit from interventions to enhance safety and independence.  Rehab  potential good for stated goals.  Occupational therapy intervention for  self care/home management.  1 session evaluation & treatment.  Recommend referral to Hand therapy in [place] for Murali custom hand based palmar abd splints to aid functional pinch force.  Equipment recommended: see below.    Goals:   Target date: today  Patient, family and/or caregiver will verbalize understanding of evaluation results and implications for functional performance.  Patient, family and/or caregiver will verbalize/demonstrate understanding of compensatory methods /equipment to enhance functional independence and safety.  Patient, family and/or caregiver will verbalize/demonstrate safe transfer techniques.  Patient, family and/or caregiver will verbalize/demonstrate understanding of positioning techniques/equipment.  Patient, family and/or caregiver will verbalize energy management techniques appropriate for status and setting.      Educational assessment/barriers to learning:  No barriers noted      Treatment provided this date:   Self care/home management, 25 minutes of training in:  -sit and prop arms to reduce fatigue with UE tasks-washing hair, drying and curling hair  - for holding keys to use gross grasp to start ignition on car and open doors instead of weak key pinch  -nail clipper board for cutting nails due to weak palmar pinch  -use of button hook and zipper pulls to aid dressing and button extender adaptor  -method to adapt jeans button to magnet to aid fastening  -adapted utensils-built up handles and built-up foam to slip on utensils and rocker knife to aid cutting  -use of can and bottle/jar openers to aid opening items with meal prep  -tub transfer bench for use of tub on main level and how to modify tub with shower curtain, HHS that fits over faucet and how to modify shower curtain to aid water retention while fitting around tub transfer bench  *all AE was requested from [facility] today     Response to  treatment/recommendations: receptive    Goal attainment:  All goals met    Risks and benefits of evaluation/treatment have been explained.  Patient, family and/or caregiver are in agreement with Plan of Care.     Timed Code Treatment Minutes: 25  Total Treatment Time (sum of timed and untimed services): 40 min    Signature: AMINATA Moncada, [facility]   Date: 1/16/2020

## 2020-01-16 NOTE — PROGRESS NOTES
"    OUTPATIENT PHYSICAL THERAPY CLINIC NOTE  Klarissa Ni  YOB: 1965  [id number]    Type of visit:         Evaluation     Date of service: 1/16/2020    Referring provider: Dr. Friedman     present: No    Others present at visit:  Spouse / significant other, Dat    Medical diagnosis:   Amyotrophic lateral sclerosis (ALS)    Date of diagnosis: 1/14/2020    Pertinent history of current problem (include personal factors and/or comorbidities that impact the plan of care): Pt noted onset of dyspnea when walking 3/2019, with no clear reason found with cardio-pulmonary workup. Then 7/2019 pt noted dysarthria with need to repeat herself, and some dysphagia. Pt now has some head drop, upper (L>R) and lower extremity weakness, and has noted tripping on her feet, with falls. H/o 2 lumbar surgeries (L4-S1 fusion) d/t degenerative disc disease, carpal tunnel s/p R release, bladder cancer s/p resection x2, Gastric bypass, anemia, one kidney stone, and depression.     Cardio-respiratory status:  Forced vital capacity: 53 % of predicted   Bipap ordered    Height/Weight: 5'1\" / 164 lb    Living environment:  House, 1 story     Living environment barriers:  3 stairs to enter (1 railing present)  Full flight stairs within home (1 railing present) to lower level                     Walk-in shower downstairs and tub only main level     Current assistance/living environment:  Lives with spouse                     1 son next door     Current mobility equipment:  None  *4ww recommended today  *Gait belt recommended today     Current ADL equipment:  ? Shower/tub chair in storage   Regular height toilets                Technology used: computer some 1 x/week    Patient concerns/goals: Pt reports foot drop, hand weakness-moderate, speech changes, doing steps to basement, weak and exhausted all the time, needed help to getup steps this morning, cannot open jug of milk or plug in items to " outlet    Evaluation   Interview completed.   Pain assessment: Pain present. Muscular pain, cramps. 3/10 rating. If kneels down   Range of motion: B LE WFL     Manual muscle testing: Hip flexion 3+/5 R, 4-/5 L, hip abd 4-/5 R, 4/5 L, knee ext 4+/5 R, 5/5 L, knee ext 5/5 B, ankle DF 4+/5 B   Gait: Pt amb with instability needing sidestep to stabilize herself in short distance in clinic room. Pt then trialed 4ww with improved stability, reports feeling more confident and comfortable.    Cognition: Intact. Does appear overwhelmed with new Dx.    *ALS FRS-R (ALS Functional Rating Scale-Revised) completed today. Please see separate note. Total: 31    Fall Risk Screen:   Has the patient fallen 2 or more times in the last year? Yes, couple falls. Had things in her hands on 3 steps to side door, did have rail but other hand was full and legs gave out. Legs get so weak they will give out. If on the floor getting something from a low cabinet, have to scoot on hands and knees to a chair to get up.       Has the patient fallen and had an injury in the past year? No       25ft walk: 19.5 sec, 19 steps, using 4ww for trial    Is the patient a fall risk? Yes, department fall risk interventions implemented     Impairments:  Fatigue  Muscle atrophy  Coordination  Balance     Treatment diagnosis:  Impaired mobility  Impaired activities of daily living    Clinical Presentation: Evolving/Changing  Clinical Presentation Rationale: Progressive nature of disease with respiratory, speech, upper and lower extremity involvement and significant fatigue limiting mobility and ADLs.   Clinical Decision Making (Complexity): Moderate complexity     Recommendations/Plan of care:  1 session evaluation & treatment.  Equipment recommended: 4 wheeled walker and gait belt- requested from [employer].   -Encouraged considering transport wheelchair and bedrail, pt states not ready for these now.      Goals:   Target date: 1/16/2020  Patient, family and/or  caregiver will verbalize understanding of evaluation results and implications for functional performance.  Patient, family and/or caregiver will verbalize/demonstrate understanding of compensatory methods /equipment to enhance functional independence and safety.    Educational assessment/barriers to learning:   No barriers noted     Treatment provided this date:   Self-care/Home management, 18 minutes  -Pt completed trial of 4ww, recommend this as safe assistive device for whenever walking, to reduce fatigue and risk of falls as reported d/t LEs giving out. Pt agreeable. Education provided for how to safely use brakes, sit on seat when locked, and fold for transport into vehicle. Education to not sit on seat and be propelled, dt fall risk.  -Education provided for how to use transfer belt, including how to don and use- example scenarios for use- caregiver support on the stairs and caregiver assist for floor transfer if needed, still use leverage from stable furniture and work together to lift up to stand/sit. -Advised pt not getting down to floor intentionally anymore d/t effort and fall risk with getting back up. Encouraged pt to place things she uses often at easy/safe reach vs too low or high, avoid getting down to floor, ask for help as able. She notes understanding  -Encouraged d/t report of significant fatigue on stairs, to avoid full flight of stairs as able by using main level shower with equipment provided by OT vs lower level walk-in shower. She agrees. For entry stairs to use the rail, and have belt donned for caregiver assist to avoid falls risk.   -If stairs become too difficult at entry, recommend ramp at entry. Briefly discussed types of ramps and need to ensure cleared of ice/snow. Pt/spouse note this is something they haven't considered d/t new dx just 2 days ago. Will provide my contact info for any follow-up needs after they have thought through provided information.   -Education for role of  transport wc for outings if expect it might be a lot of walking otherwise. She notes she would need to think about this. Understands the 4ww is not designed for this.  -Education for role of Bedrail to ease supine<>sit, repositioning. Pt feels she doesn't need this at this time.     Response to treatment/recommendations: Pt and spouse voice understanding, but also that this is very new (dx 2 days ago) and a lot to think about between all disciplines seen in clinic visit.     Goal attainment:  All goals met     Risks and benefits of evaluation/treatment have been explained.  Patient, family and/or caregiver are in agreement with Plan of Care.     Timed Code Treatment Minutes: 18  Total Treatment Time (sum of timed and untimed services): 25    Signature: Lucia Vanessa, PT   Date: 1/16/2020

## 2020-01-16 NOTE — LETTER
2020       RE: Klarissa Ni  [address] [facility] [address]     Dear Colleague,    Thank you for referring your patient, Klarissa Ni, to the [facility] NEUROLOGY at [facility]. Please see a copy of my visit note below.    Service Date: 2020        Amador Alex MD   [facility]    [address] [place] [address]      Michael Ibarra MD   [facility]    [address]   [place] [address]      RE: Klarissa Ni   MRN: [id number]   : 1965           Dear Peng Alex and Stacey:        I had the pleasure to see Ms. Ni in followup at the [facility] today.  This was a visit to establish care with us and meet our multidisciplinary team of therapists.  I hope you received my correspondence from 2020.  We diagnosed her with bulbar onset ALS, clinically definite per [place] criteria, based on her classic presentation and a repeat EMG that showed widespread denervation everywhere including bulbar, cervical, thoracic and lumbar muscles.  Details are in my correspondence from .  Ms. Ni was accompanied again today by her .  Her emotional adjustment to the diagnosis seems reasonable at this point.  She does not appear overtly depressed, but she did not really have a lot of questions yet about this.    We did spirometry today; forced vital capacity is 53% predicted for age, height and sex.  FEV1 is 47% predicted and MIP is -21.  Those numbers are quite disconcerting for a first ALS Clinic visit, predicting a rather short survival.      Because she already has dyspnea I recommended initiation of noninvasive ventilation at night which can improve the patient's fatigue and daytime energy and does significantly prolong survival in my opinion. I explained to her the mode of action of BiPAP and she should give it at  least a try.      Ms. Ni has lost more than 10% of her weight in the last year, and she has a moderate degree of dysphagia.  We discussed methods of augmenting her nutrition.  Of course, she will meet our nutrition specialist but other than a high-calorie diet, I also brought up an early discussion about gastrostomy placement.  Gastrostomy placement is time sensitive and it cannot be safely done when the forced vital capacity is too low (below 35-40%).  Since the number is 53% right now and the patient is experiencing dyspnea, I think it is important to consider it sooner than later and even if enteral nutrition is not necessarily required to maintain her caloric needs right now, the tube could be placed in the next 2-3 months, flushed, and used to support nutrition later when the need will become more obvious. The patient understands this concern and will think about it.  She is not yet ready to make a decision.     I discussed the availability of riluzole, a medication that prolongs survival in ALS by 3-4 months, although it does not affect strength or quality of life.  She is not interested in this.  I did not discuss edaravone because she is already in a more advanced stage of the disease than the individuals who were eligible for the phase III trial that proved edaravone's efficacy in 2016, and I personally do not believe that this drug would prolong her survival at this point.       I offered her a discussion about research options in ALS and she would prefer a phone call from our research coordinator.  She will see our entire multidisciplinary team including PT, OT, Speech and Nutrition therapist today.  She will be registered with [employer] and [facility].  She will follow up in our clinic in 3 months or sooner if needed.     TT spent for patient care 25 minutes; more than half was counseling.          Sincerely,        Latrell Friedman MD           D: 01/16/2020   T: 01/16/2020   MT: carlos      Name:      DINA SMITH   MRN:      [id number]        Account:      [id number]   :      1965           Service Date: 2020      Document: [id number]

## 2020-01-16 NOTE — PROGRESS NOTES
Service Date: 2020        Amador Alex MD   [facility]    [address] [place] [address]      Michael Ibarra MD   [facility]    [address] [place] [address]      RE: Klarissa Ni   MRN: [id number]   : 1965           Dear Peng Alex and Stacey:        I had the pleasure to see Ms. Ni in followup at the [facility] today.  This was a visit to establish care with us and meet our multidisciplinary team of therapists.  I hope you received my correspondence from 2020.  We diagnosed her with bulbar onset ALS, clinically definite per [place] criteria, based on her classic presentation and a repeat EMG that showed widespread denervation everywhere including bulbar, cervical, thoracic and lumbar muscles.  Details are in my correspondence from .  Ms. Ni was accompanied again today by her .  Her emotional adjustment to the diagnosis seems reasonable at this point.  She does not appear overtly depressed, but she did not really have a lot of questions yet about this.    We did spirometry today; forced vital capacity is 53% predicted for age, height and sex.  FEV1 is 47% predicted and MIP is -21.  Those numbers are quite disconcerting for a first ALS Clinic visit, predicting a rather short survival.      Because she already has dyspnea I recommended initiation of noninvasive ventilation at night which can improve the patient's fatigue and daytime energy and does significantly prolong survival in my opinion. Patient will benefit from Trilogy NIV in AVAPS-AE mode due to ALS and low MIP. Trilogy will provide the needed dynamic support that traditional BiPAP will not provide. For this reason, Trilogy NIV is required.      Ms. Ni has lost more than 10% of her weight in the last year, and she has a moderate degree of dysphagia.  We discussed methods of  augmenting her nutrition.  Of course, she will meet our nutrition specialist but other than a high-calorie diet, I also brought up an early discussion about gastrostomy placement.  Gastrostomy placement is time sensitive and it cannot be safely done when the forced vital capacity is too low (below 35-40%).  Since the number is 53% right now and the patient is experiencing dyspnea, I think it is important to consider it sooner than later and even if enteral nutrition is not necessarily required to maintain her caloric needs right now, the tube could be placed in the next 2-3 months, flushed, and used to support nutrition later when the need will become more obvious. The patient understands this concern and will think about it.  She is not yet ready to make a decision.     I discussed the availability of riluzole, a medication that prolongs survival in ALS by 3-4 months, although it does not affect strength or quality of life.  She is not interested in this.  I did not discuss edaravone because she is already in a more advanced stage of the disease than the individuals who were eligible for the phase III trial that proved edaravone's efficacy in 2016, and I personally do not believe that this drug would prolong her survival at this point.       I offered her a discussion about research options in ALS and she would prefer a phone call from our research coordinator.  She will see our entire multidisciplinary team including PT, OT, Speech and Nutrition therapist today.  She will be registered with [employer] and [facility].  She will follow up in our clinic in 3 months or sooner if needed.     TT spent for patient care 25 minutes; more than half was counseling.          Sincerely,        MD SAAD Palma MD             D: 2020   T: 2020   MT: tb      Name:     DINA SMITH   MRN:      [id number]        Account:      [id number]   :      1965           Service Date:  01/16/2020      Document: [id number]

## 2020-01-16 NOTE — PATIENT INSTRUCTIONS
You will be contacted about the BiPAP machine.        Please call Alyssa @ [phone] for questions or concerns during regular business hours. For a more efficient way to communicate, use Moovit and address the message to your physician. Remember, MyChart is only read during business hours. Do not leave urgent messages on voicemail or [employer]. If situation is urgent, contact the Neurology Clinic @ [phone] and ask to speak to a Triage Nurse or Call 911 or visit an Emergency Department.    Please call your pharmacy if you need a medication refill. They will send us an electronic message.

## 2020-01-17 ENCOUNTER — ALLIED HEALTH/NURSE VISIT (OUTPATIENT)
Dept: NUTRITION | Facility: CLINIC | Age: 55
End: 2020-01-17

## 2020-01-17 DIAGNOSIS — G12.21 ALS (AMYOTROPHIC LATERAL SCLEROSIS) (H): Primary | ICD-10-CM

## 2020-01-17 NOTE — PROGRESS NOTES
"  ALS Social Work Assessment  Collaborated with: Klarissa and spouse Dat, Dr Friedman and members of the ALS interdisciplinary team  Support System: Dat, their son Ned who lives next door, her best friend Rosalia, her parents, brother, sister all living in [place] and Dat's 2 brothers out of town.   Living Situation: They live in their own home in [place]. There are 3 stairs to enter and 1 flight of stairs within the home  Functional Capacity: independent  Primary Caregiver: Dat  Employment status: unemployed. She stopped working 5 years ago due to back problems. She worked part time prior to that mostly. She has never applied for social security disability.  Financial stability/insurance: Stable but lower income, Dat has 401K [facility] savings and is nearing 61 and is considering early [facility] to care for Klarissa. They have a $2750. Per person deductible with his health plan thru his employer.  Agencies involved: registering with ALSA today  Cultural/ethnic/spiritual: none identified.   Mental Health/CD/Cognitive concerns: long history of depression. On medication that helps. She has had some periods of it being worse and better. She knows the signs of worsening. She reports she becomes more pérez, irritable, sad. Her PCP Dr Cobos manages her medications  Coping style/adjustment to ALS: She just found out 2 days ago that she has ALS. She is sad, having difficulty sleeping as would be expected. She indicated that she \"knew is was going to be something bad\" based on her symptoms.   Legal concerns: they haven't done any [place], $POAs  Advanced Care Planning: doesn't have a health care directive. We will discuss it in the future.   Goals/Strengths: Supportive spouse, son next door can help when available, good friend supportive.   Resource Needs: Needs to apply for SSDI.   Education Provided: Reviewed how to apply for SSDI, Discussed Medicare if SSDI approved. Reviewed the [place] program for " services/home modifications. Can address further during upcoming appts  Intervention/Assessment: Pt concerned that she may not be able to get a gtube both because of her previous barriatric surgery and also due to having someone available to do it. Discussed that friends/family can learn how to administer and frequency can be further discussed with dietician.  She reports that her mental health has been stable recently. Has some anxiety due to ALS dx. She will contact her PCP if she sees symptoms worsening. She is in the initial phase of shock with the dx.  Plan: Remain available to assist Klarissa and Dat thru the course of her illness. Encouraged them to contact me as needed.     LARRY Sims, [facility]    [facility] [place]  [phone]

## 2020-01-17 NOTE — PROGRESS NOTES
"CLINICAL NUTRITION SERVICES - ASSESSMENT NOTE    Klarissa Ni 54 year old referred for MNT related to ALS    Visit Type: Initial  Referring Physician: Idalia  Pt accompanied by: her , Dat    NUTRITION HISTORY  Factors affecting nutrition intake include: decreased appetite since diagnosis, 2 days ago - has not had much of anything x past 2 days  Current diet: general  Current appetite/intake: poor  PEG Tube: Considering feeding tube with lower FVC of 53%.     Klarissa has a h/o gastric bypass surgery weight loss ~11 years ago.    With this altered GI anatomy, she may require a PEG/J tube for her nutrition.      Klarissa reports that over the past 1 year, she had intentionally been trying to lose weight as her doctor in [place] said her SOB was from obesity.  With this, she lost ~15 lbs.    She eventually stopped dieting as things got worse even though she lost weight.  When she stopped dieting, she continued to lose weight.     She denies difficulty chewing or swallowing.     Diet Recall  Breakfast Ice cappuccino with skim milk    Lunch Grazes on trail mix   Dinner Smart One dinner   Snacks Fruit, crackers, cheese   Beverages Water, coffee     ANTHROPOMETRICS  Height: 61\"  Weight: 164 lbs/74kg  BMI: 31  Weight Status:  Obesity Grade I BMI 30-34.9  IBW: 105 lbs  % IBW: 156%  Weight History:   Wt Readings from Last 8 Encounters:   01/16/20 73 kg (161 lb)   01/14/20 74.7 kg (164 lb 11.2 oz)     Dosing Weight: 54kg    Medications/vitamins/minerals/herbals:   Reviewed    LABS  Labs reviewed    ASSESSED NUTRITION NEEDS:  Estimated Energy Needs: 3512-2741 kcals (30-35 Kcal/Kg)  Justification: increased needs with ALS  Estimated Protein Needs: 65 grams protein (1-1.2 g pro/Kg)  Justification: maintenance  Estimated Fluid Needs: 7509-0890  mL   Justification: 1mL/kcal     MALNUTRITION:  % Weight Loss:  > 10% in 6 months (severe malnutrition)  % Intake:  </= 50% for >/= 5 days (severe malnutrition)  Subcutaneous " Fat Loss:  None observed  Muscle Loss:  None observed  Fluid Retention:  None noted    Malnutrition Diagnosis: Severe malnutrition  In Context of:  Chronic illness or disease    NUTRITION DIAGNOSIS:  Inadequate oral intake related to decreased appetite as evidenced by weight loss.     INTERVENTIONS  Provided written & verbal education on:   - Ways to maximize kcal and protein intake. Discussed calorie and protein needs for maintenance of weight and nutrition status.  Advised pt to aim for at least 1600-1900kcal and 70g protein via 5-6 small frequent meals.  Discussed that as ALS progresses, eating may become more difficult and discussed ways to cope with this.   - Discussed potential need for G OR J-tube.  Described placement surgery and how it is used. Discussed formula and administration methods (gravity/bolus or continuous).  Discussed that this would be able to provide full nutrition prn.  Demonstrated TRISTAN-KEY feeding tube model.   - ONS (Ensure Enlive, Ensure Plus/Boost Plus, CIB, Benecalorie, Scandi shake etc). Suggested ways to incorporate these supplements to avoid flavor fatigue. Encouraged pt to start consuming 2 ONS daily.       Provided pt with corresponding education materials/handouts on:  Six Small Meals a Day, High Calorie, High Protein Recipe booklet, Tips to Increase the Calories in Your Diet, Tips to Increase the Protein in Your Diet and Protein Sources.      Pt verbalize understanding of materials provided during consult.   Patient Understanding: Excellent  Expected Compliance: Excellent     Goals  1.  Aim for 5-6 small frequent meals  2.  Aim for 1600-1900kcal and 70g protein/day  3. Weight maintenance      Follow-Up Plans: Pt has RD contact information for questions - will follow-up with Klarissa at next clinic visit in 3 months.     MONITORING AND EVALUATION:  -Food intake  -Fluid/beverage intake  -Liquid meal replacement or supplement  -Weight trends    Piper Clancy RDN,  ARTEMN

## 2020-01-18 LAB
EXPTIME-PRE: 6.64 SEC
FEF2575-%PRED-PRE: 34 %
FEF2575-PRE: 0.8 L/SEC
FEF2575-PRED: 2.33 L/SEC
FEFMAX-%PRED-PRE: 32 %
FEFMAX-PRE: 1.99 L/SEC
FEFMAX-PRED: 6.09 L/SEC
FEV1-%PRED-PRE: 47 %
FEV1-PRE: 1.13 L
FEV1FEV6-PRE: 72 %
FEV1FEV6-PRED: 82 %
FEV1FVC-PRE: 71 %
FEV1FVC-PRED: 80 %
FIFMAX-PRE: 1.84 L/SEC
FVC-%PRED-PRE: 53 %
FVC-PRE: 1.58 L
FVC-PRED: 2.98 L
MEP-PRE: 28 CMH2O
MIP-PRE: -21 CMH2O

## 2020-01-20 DIAGNOSIS — G12.21 ALS (AMYOTROPHIC LATERAL SCLEROSIS) (H): Primary | ICD-10-CM

## 2020-01-21 ENCOUNTER — TELEPHONE (OUTPATIENT)
Dept: NEUROLOGY | Facility: CLINIC | Age: 55
End: 2020-01-21

## 2020-01-21 NOTE — TELEPHONE ENCOUNTER
BiPAP order faxed to [facility] @ [phone].  Hand Therapy referral faxed to Stacy Lopez @ [phone].

## 2020-01-27 ENCOUNTER — MEDICAL CORRESPONDENCE (OUTPATIENT)
Dept: HEALTH INFORMATION MANAGEMENT | Facility: CLINIC | Age: 55
End: 2020-01-27

## 2020-02-12 ENCOUNTER — PRE VISIT (OUTPATIENT)
Dept: NEUROLOGY | Facility: CLINIC | Age: 55
End: 2020-02-12

## 2020-02-19 ENCOUNTER — TELEPHONE (OUTPATIENT)
Dept: NEUROLOGY | Facility: CLINIC | Age: 55
End: 2020-02-19

## 2020-02-26 ENCOUNTER — TELEPHONE (OUTPATIENT)
Dept: NEUROLOGY | Facility: CLINIC | Age: 55
End: 2020-02-26

## 2020-02-26 NOTE — TELEPHONE ENCOUNTER
Patient reporting she returned home from ED visit for SOB and was unable to use BiPAP last night as she could not lay on her back due to feeling increased SOB when doing so. Reiterated to patient that wearing BiPAP is necessary and to try propping with pillows or sitting in recliner. Patient has used BiPAP for a couple hours this morning and reports she feels better. Encouraged pt to use BiPAP periodically throughout the day and during HS. Pt agreeable.

## 2020-03-26 ENCOUNTER — TELEPHONE (OUTPATIENT)
Dept: NEUROLOGY | Facility: CLINIC | Age: 55
End: 2020-03-26

## 2020-03-26 NOTE — TELEPHONE ENCOUNTER
"Patient call re: worsening SOB over past few weeks. Pt using BiPAP nightly and states she attempted to use during the day and felt no benefit. Patient stated \"it doesn't feel strong enough, like it is doing anything\".  Pt denies any cough, fever, nasal discharge or headache/body aches.  Klarissa reports she is having more difficulty time with balance and speech. States she is able to swallow as long as she eats slowly and takes small bites. Plan for [employer] company to adjust BiPAP settings and trial full face mask vs nasal pillows. Pt agrees to use her walker for safety. Routed to provider for input.  "

## 2020-03-26 NOTE — TELEPHONE ENCOUNTER
I have no further input on this. She should use BiPAP as much as possible during the day. This reflects progression of her ALS and is not a good sign. It would be very ill advised to bring her to Clinic right now with the COVID19 epidemic. She should also avoid the ED for the same reason. Thanks

## 2020-04-23 ENCOUNTER — VIRTUAL VISIT (OUTPATIENT)
Dept: NEUROLOGY | Facility: CLINIC | Age: 55
End: 2020-04-23
Payer: COMMERCIAL

## 2020-04-23 DIAGNOSIS — G12.21 ALS (AMYOTROPHIC LATERAL SCLEROSIS) (H): Primary | ICD-10-CM

## 2020-04-23 NOTE — PATIENT INSTRUCTIONS
Start using trazodone during the morning (50 mg) to see if this will help your anxiety and claustrophobia and allow you to use the BiPAP  I would encourage you to try biPAP during the day  We should try to arrest your weight loss. I will talk to our nutritional therapist and ask her to call you with recommendations.    I will ask our physical, occupational, and speech therapists to call you too.    Follow up in 6 weeks.     Please consider filling a health care directive soon.

## 2020-04-23 NOTE — PROGRESS NOTES
Unable to reach x4. Phone number goes straight to automated recording and  is not currently home.    Constantino Duval, EMT

## 2020-04-23 NOTE — PROGRESS NOTES
Service Date: 2020     Amador Alex MD   [facility]    [address] [place] [address]      Michael Ibarra MD   [facility]    [address] [place] [address]      RE:      Klarissa Ni   MRN:   [id number]   :   1965           Dear Peng Alex and Stacey:        I had the pleasure to evaluate Mrs Ni via billable telephone visit today. In person visit was not recommended due to ongoing COVID-19 pandemic and patient being high risk for COVID19 related complications.   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 I last met with her in Clinic 2 months ago. In the interim, things have worsened, mostly her dyspnea. She now endorses dyspnea in the shower or when standing for long time, not just with exertion. She now uses AVAPs noninvasive ventilation the entire night. She feels she does have to use it briefly during the day too, but it has been impossible to put the mask on during the day due to severe anxiety/claustrophobia. Taking trazodone 50 mg at bedtime helped and she is able to sleep with it and tolerate the mask. She does not take trazodone during the day. As long as she uses NIV at night, she can sleep with one pillow.  Her speech has also worsened to the point she has to repeat herself to others and she is not fully intelligible. She still does not use any augmentative communication device. Regarding sialorrhea, it is rather mild, occurring mostly at night and occasionally at daytime; it does not sound too bothersome. She has mild dysphagia in that she has to eat slower and more carefully and drink slowly; infrequently she may experience a choking episode with liquids. She does not feel that she avoids any particular foods. That said, she is now down to 141 lbs and has lost 23 lbs from her last visit. Two years ago she was weighing between 170-180 lbs. Her appetite is  diminished. She has seen our nutritional therapist and recommended to take protein supplements but she does not use them consistently.She denies pseudobulbar affect.  She has noted more weakness of upper and lower extremities, including more difficulties with her handwriting, cutting food, and using utensils. Her walking is much slower and more effortful and she has fallen once. She does have to use a walker intermittently now which is a change from 2 months ago.   She has not addressed her advance directives yet, although she plans to have a discussion with her family about HCD soon.     ALSFRS-R score obtained by the patient was 28 today (previously 31, so three points down). Scores per question (1-12) are as follows: //3/2//3/3/3///3/    In summary, Mrs Ni has progressive bulbar onset ALS with symptoms among all domains/regions, including bulbar, respiratory, arm and leg weakness. We spent about 28 minutes on the phone (start 10.32 am, end 11.00 am) of which more than half was counseling. We discussed the followin. Advance directives. Those should be discussed very soon with her family. Klarissa had FVC of 53% at baseline 2 months ago and she is more dyspneic now. We could not do spirometry remotely today (of course), but I suspect her number would be quite lower now. The life expectancy of an individual with ALS and FVC <50% is often 6-12 months, sometimes less. She understands this concept and will update me with her decisions re: end of life care and measures.  2. Use of NIV. I encouraged her to try this during the day. I believe that taking 50 mg of trazodone in the morning may help fight her anxiety/claustrophobia. This is safer than benzodiazepines with regards to respiratory depression and she should try this first. If it doesn't work I am willing to offer her a low lorazepam dose cautiously.  3. Bulbar symptoms.  I will ask our speech therapist to evaluate her remotely. Augmentative  "communication devices should be discussed. Her sialorrhea is mild and does not require intervention right now. She does not have PBA.   4. Nutrition. Regarding dysphagia, it is not too severe, but the patient has lost >20 lbs of weight recently. I again discussed gastrostomy placement. While I feel it is clearly indicated at this point, I am afraid her respiratory dysfunction from ALS is to such a degree that it would not be safe to place her flat on the IR-table and attempt an IR guided gastrostomy which may take 30-45 minutes. Therefore, we will defer gastrostomy, and ask her to talk to our dietician regarding high calorie intake and protein supplements. She has anorexia, but I d rather defer prescribing her meds for it right now because the medication I intend to use (mirtazapine) can interact with other antidepressants she is on (trazodone, etc) and cause QT prolongation.  5. Weakness of upper and lower extremities. I will ask her to talk to our physical and occupational therapists. She may require an assistive device to walk at this point.   6. Follow up. Because Yancys ALS is quite advanced and there are very important questions that need to be clarified (most importantly advance directives), I would like to see her back in Clinic in 6 weeks. However, I understand her concerns about COVID19 transmission and therefore, if we can meet her expectations and address her problems by remote visit , we will attempt to do so.    Klarissa is not interested in riluzole and edaravone was not offered because it is very unlikely to help her at this stage of the disease.    Sincerely,        MD SAAD Palma MD      ALSFRS  28       Klarissa Ni is a 54 year old female who is being evaluated via a billable telephone visit.      The patient has been notified of following:     \"This telephone visit will be conducted via a call between you and your physician/provider. We have found " "that certain health care needs can be provided without the need for a physical exam.  This service lets us provide the care you need with a short phone conversation.  If a prescription is necessary we can send it directly to your pharmacy.  If lab work is needed we can place an order for that and you can then stop by our lab to have the test done at a later time.    Telephone visits are billed at different rates depending on your insurance coverage. During this emergency period, for some insurers they may be billed the same as an in-person visit.  Please reach out to your insurance provider with any questions.    If during the course of the call the physician/provider feels a telephone visit is not appropriate, you will not be charged for this service.\"    Patient has given verbal consent for Telephone visit?  Yes    How would you like to obtain your AVS? MyChart    Phone call duration: 28 minutes    Latrell Friedman MD      "

## 2020-04-28 ENCOUNTER — PATIENT OUTREACH (OUTPATIENT)
Dept: CARE COORDINATION | Facility: CLINIC | Age: 55
End: 2020-04-28

## 2020-04-28 NOTE — PROGRESS NOTES
"Social Work -ALS Clinic Progress Note  [facility]    Data/Intervention:    Patient Name:  Klarissa Ni  /Age:  1965 (54 year old)    Visit Type: telephone    Collaborated With:    -Klarissa  -Dr Friedman    Updates/Concerns:   Contacted Pt to discuss psychosocial needs and address advance care planning. She had a recent video visit with Dr Friedman due to the covid 19 pandemic. She has a f/u appt here in 6 weeks that she's leery about attending both due to covid but also her weakness.  Klarissa indicated that Dat her spouse is still working but is planning on an FMLA soon to care for her. When he is at work, her son Ned who lives next door and her sister Nguyen come to help her. Her parents also visit and her best friend calls her frequently but stays away due to covid 19. Dat helps her with showering in the basement. She indicated she can go down stairs but coming up it more difficult and she needs his assistance. She has a walker at home but it's difficult to use due to her UE weakness.   Pt reports that she never applied for SSDI and doesn't seem to recall our discussion about it. When I indicated that it would take 5 months to get her first payment, she said \"I don't think I will be around then\". She feels her breathing has worsened. Her speech has worsened as well but it was mostly intellligable.     Intervention/Education/Resources Provided:  Reviewed her situation and provided emotional support on how difficult this is.  Reviewed advance care planning and provided an overview of the documents. She has a blank health care directive from her PCP office that she just rec'd and she plans to fill it out. Discussed what is included in that document. I then reviewed the POLST and the purpose of that. She indicated that she would not want to be kept alive on machines and wouldn't want intubation or resuscitation. She would want to have medications that would reduce any " suffering. Discussed hospice. Her brother had hospice prior to his death and she said they were great. She is wondering at what point Dr MCGOWAN would suggest hospice. Discussed that getting all of the DME she needs prior to the referral is wise. Discussed the prediction of 6 months of life for hospice and then indicated I would ask Dr Friedman and convey her wishes re no life prolonging emergency measures and get back to her. She will find out the name of the hospice program that was used by her brother.  Supported the idea of Dat taking a LORRIE soon which is in the works.     Assessment/Plan:  Pt seems resigned to her death from ALS and wanting no life prolonging emergency care. She is managing at home ok with the help of her family and indicated that Dat is doing a great job in her caregiver role.   Will communicate with Dr Friedman and follow up with her in a few days. If she enrolls in hospice relatively soon, they would assist her in completing a POLST.   .     Rhoda Lamar, LARRY, [facility]    [facility]  [phone]

## 2020-04-29 ENCOUNTER — VIRTUAL VISIT (OUTPATIENT)
Dept: ONCOLOGY | Facility: CLINIC | Age: 55
End: 2020-04-29
Attending: DIETITIAN, REGISTERED
Payer: COMMERCIAL

## 2020-04-29 VITALS — WEIGHT: 132 LBS | BODY MASS INDEX: 24.94 KG/M2

## 2020-04-29 DIAGNOSIS — G12.21 ALS (AMYOTROPHIC LATERAL SCLEROSIS) (H): Primary | ICD-10-CM

## 2020-04-29 NOTE — PROGRESS NOTES
CLINICAL NUTRITION SERVICES - REASSESSMENT NOTE   EVALUATION OF PREVIOUS PLAN OF CARE:   Referring Physician: Idalia Cortés 19 restrictions - phone visit - No charge for ALS patients  Current diet: general, soft foods  Current appetite/intake: poor  PEG Tube: No        Monitoring from previous assessment:   -Food intake - Klarissa tells me that her intake continues to decline due to lack of appetite.  She also has mild dysphagia, however, reports that poor appetite is mostly the cause of her weight loss.  She avoids sticky foods, crunchy foods and grisly meats.  Everything else, she is able to eat  She is unable to prepare meals, thus relies on her mom and  to help with this.    She declines OA meal service at this time as they are out of delivery range and she does not plan to travel with Covid restrictions.   Diet recall - B yogurt, fruit; L [employer] drive thru (chicken nuggets); D mashed potatoes, chicken, tuna or pork tenderloin, cooked vegetables OR casseroles OR soups  -Liquid meal replacement or supplement - She drinks 1 Boost 1-2 times/week; not consistent.  She is willing to try Boost plus (higher calories) and mix within a smoothie to vary flavor and texture. She makes cappuccinos with whole milk and protein powder.    -Weight trends - reported wt of 132 lbs on home scale today 4/29 (down 18% x past 4 months)     Wt Readings from Last 10 Encounters:   04/29/20 59.9 kg (132 lb)   01/16/20 73 kg (161 lb)   01/14/20 74.7 kg (164 lb 11.2 oz)     Previous Goals:   1.  Aim for 5-6 small frequent meals - goal not met  2.  Aim for 1600-1900kcal and 70g protein/day -  goal not met  3. Weight maintenance -  goal not met  Evaluation: Not met   Previous Nutrition Diagnosis:   Inadequate oral intake related to decreased appetite as evidenced by weight loss.   Evaluation: Declining   NEW FINDINGS:   18% wt loss x past 4 months  Dysphagia, difficulty feeding self.    CURRENT NUTRITION DIAGNOSIS   Inadequate  protein-energy intake related to decreased appetite, dysphagia as evidenced by 18% (30 lb) wt loss x past 4 months.    INTERVENTIONS   Recommendations / Nutrition Prescription   1. Start taking 2 high belle ONS/day (Boost Plus, Ensure Plus, Ensure Enlive), make into home made shake/smoothie as able  Implementation  Reviewed ways to maximize and fortify foods with extra calories and protein. Discussed calorie and protein needs for maintenance of weight and nutrition status.  Advised pt to aim for at least 1600-1900kcal and 70g protein via 5-6 small frequent meals.   Strongly encouraged ONS with home made shakes/smoothies. She has recipe booklet provided by RD at last clinic visit that she will utilize.      Goals:  1. ONS - start taking at least 2/day   2. Aim for 1600kcal, 60-70g protein/day  3. Weight maintenance 130 lbs as able     Follow-up/monitoring:  - Food/ONS intake  - Weight trends    Piper Clancy RDN,   [facility]  [phone]

## 2020-04-30 ENCOUNTER — PATIENT OUTREACH (OUTPATIENT)
Dept: CARE COORDINATION | Facility: CLINIC | Age: 55
End: 2020-04-30

## 2020-04-30 DIAGNOSIS — G12.21 ALS (AMYOTROPHIC LATERAL SCLEROSIS) (H): Primary | ICD-10-CM

## 2020-04-30 NOTE — PROGRESS NOTES
Social Work Follow-Up  [facility]    Data/Intervention:  Patient Name:  Klarissa Ni  /Age:  1965 (54 year old)    Reason for Follow-Up:  F/u discussion re hospice    Collaborated With:    -Dr Friedman  -Klarissa  -[facility]    Intervention/Education/Resources Provided:  Communicated with Dr Friedman and contacted Pt re his feedback re if/when she is hospice appropriate. I let her know that PFTs likely can't be done for her locally and in the [facility] it will be another 1-2 months at minimum until they are doing them as they use PPE.   She's not really that interested in doing it. She states she knows her lung function is worse.  I let her know that if she was interested in hospice, that Dr MCGOWAN indicated she could enroll anytime.   Clarified that her wishes are consistent with those of the hospice philosophy.   She would like to consider enrolling and get more information at least. She would like a referral to be sent to [facility]. That's who her brother in law used and they were pleased.  I made a referral to [facility] and faxed the order.    Assessment/Plan:  Pt seemed at peace with her decision and will appreciate the support of hospice.  She is aware she can continue to see us/Dr Friedman here.    Previously provided patient/family with writer's contact information and availability.       Rhoda Lamar, MSW, [facility]    [facility]  [phone]

## 2020-05-05 ENCOUNTER — TELEPHONE (OUTPATIENT)
Dept: NEUROLOGY | Facility: CLINIC | Age: 55
End: 2020-05-05

## 2020-05-05 DIAGNOSIS — G12.21 ALS (AMYOTROPHIC LATERAL SCLEROSIS) (H): Primary | ICD-10-CM

## 2020-05-05 NOTE — TELEPHONE ENCOUNTER
SLP contacted patient at MD request. Planned for telehealth visit but patient states she does not have the technology available. SLP spoke with patient today to follow up on speech and swallowing changes with progression of ALS. Patient states she has started hospice and therefore does not plan to come back to ALS Clinic.     She states she is managing swallowing by being mindful and taking small amounts and going slow. She is choosing softer foods. She denies further questions or concerns regarding swallowing.    Her speech is noticeably more dysarthric than when last evaluated by this SLP however she is over 85% intelligible during phone conversation. She states she fears she will eventually be unable to communicate but SLP assured her that with Augmentative-Alternative Communication options her team can find a functional means of communication. SLP explained several AAC options and stated they could be made available by the [facility]. She does not feel she needs to pursue at this time but states she is happy to know there are options available. SLP provided patient with direct contact information and encouraged her and/or her hospice team to contact SLP with any questions regarding communication.    Patricia Mcrae MS (Kate), CCC-SLP  Speech Language Pathologist  Phone: [phone]   Email: [email or web address]  [facility] (Mondays, Wednesdays, Fridays)  [facility] (Thursdays)

## 2024-02-22 NOTE — TELEPHONE ENCOUNTER
[facility] paperwork mailed to [facility].  Copy mailed to patient.   MD Office